# Patient Record
(demographics unavailable — no encounter records)

---

## 2025-04-10 NOTE — ASSESSMENT
[FreeTextEntry1] : 4/10/25:  X-rays left knee 4 views show no fracture.  Tender over medial joint line and clicking present.  Obtain MRI to rule out medial meniscus tear.  Follow up after MRI

## 2025-04-10 NOTE — HISTORY OF PRESENT ILLNESS
[de-identified] : 04.10.25 Patient is a 58-year-old female complaining of the left knee pain. Pt states after no injury or trauma she noticed a stabbing pain in the Lt knee on 04.05.25. Pain is described as sharp and unstable. Denies any prior injury or trauma. No formal treatment.     [FreeTextEntry1] : left knee pain

## 2025-04-10 NOTE — HISTORY OF PRESENT ILLNESS
[de-identified] : 04.10.25 Patient is a 58-year-old female complaining of the left knee pain. Pt states after no injury or trauma she noticed a stabbing pain in the Lt knee on 04.05.25. Pain is described as sharp and unstable. Denies any prior injury or trauma. No formal treatment.     [FreeTextEntry1] : left knee pain

## 2025-04-21 NOTE — ASSESSMENT
[FreeTextEntry1] : 4/10/25:  X-rays left knee 4 views show no fracture.  Tender over medial joint line and clicking present.  Obtain MRI to rule out medial meniscus tear.  Follow up after MRI    04/21/2025: MRI reviewed and discussed.   Based on my independent interpretation of the MRI images there's arthritis and focal chondral defect over lateral femoral condyle but no meniscus tears.   Underlying pathology and treatment options discussed.  Obtain US LT knee to R/O popliteal cyst.  Activity modification as tolerated. Questions addressed. Follow up after US.   The documentation recorded by the scribe accurately reflects the service I personally performed and the decisions made by me. I, Erika Gale, attest that this documentation has been prepared under the direction and in the presence of Provider Ulysses Solano MD.   The patient was seen by Ulysses Solano MD.

## 2025-04-21 NOTE — DATA REVIEWED
[MRI] : MRI [Left] : left [Knee] : knee [Report was reviewed and noted in the chart] : The report was reviewed and noted in the chart [I independently reviewed and interpreted images and report] : I independently reviewed and interpreted images and report [FreeTextEntry1] : MRI LT knee OC 04/12/25: 1. Minimal joint effusion with small focus of chondral edema over the lateral femoral condyle. Trace iliotibial bursal fluid. No meniscal or ligament injury.  2. Lobular 1 cm hyperintense focus adjacent to the fibular head. Differential possibilities include a small ganglion cyst, neural lesion or vascular malformation. Correlation with popliteal ultrasound study suggested.

## 2025-04-21 NOTE — HISTORY OF PRESENT ILLNESS
[0] : 0 [Full time] : Work status: full time [de-identified] :  04/21/2025: Patient returns for LT knee follow up. Symptoms continue. Patient reports clicking sensation. Has been resting the knee. NO med use. Here to review MRI results.   04.10.25 Patient is a 58-year-old female complaining of the left knee pain. Pt states after no injury or trauma she noticed a stabbing pain in the Lt knee on 04.05.25. Pain is described as sharp and unstable. Denies any prior injury or trauma. No formal treatment.     [FreeTextEntry1] : left knee pain  [de-identified] : NO

## 2025-04-21 NOTE — PHYSICAL EXAM
[Left] : left knee [NL (140)] : flexion 140 degrees [NL (0)] : extension 0 degrees [] : non-antalgic